# Patient Record
Sex: MALE | Race: OTHER | Employment: UNEMPLOYED | URBAN - METROPOLITAN AREA
[De-identification: names, ages, dates, MRNs, and addresses within clinical notes are randomized per-mention and may not be internally consistent; named-entity substitution may affect disease eponyms.]

---

## 2024-01-26 ENCOUNTER — HOSPITAL ENCOUNTER (EMERGENCY)
Facility: HOSPITAL | Age: 74
Discharge: HOME | End: 2024-01-26
Attending: EMERGENCY MEDICINE
Payer: MEDICARE

## 2024-01-26 VITALS
SYSTOLIC BLOOD PRESSURE: 154 MMHG | OXYGEN SATURATION: 97 % | TEMPERATURE: 98.5 F | HEART RATE: 61 BPM | DIASTOLIC BLOOD PRESSURE: 92 MMHG | WEIGHT: 182.7 LBS | RESPIRATION RATE: 20 BRPM

## 2024-01-26 DIAGNOSIS — L02.214 ABSCESS OF GROIN, RIGHT: Primary | ICD-10-CM

## 2024-01-26 PROCEDURE — 10061 I&D ABSCESS COMP/MULTIPLE: CPT | Performed by: PHYSICIAN ASSISTANT

## 2024-01-26 PROCEDURE — 99284 EMERGENCY DEPT VISIT MOD MDM: CPT | Performed by: PHYSICIAN ASSISTANT

## 2024-01-26 PROCEDURE — 99282 EMERGENCY DEPT VISIT SF MDM: CPT

## 2024-01-26 RX ORDER — LIDOCAINE HYDROCHLORIDE AND EPINEPHRINE 10; 10 MG/ML; UG/ML
1 INJECTION, SOLUTION INFILTRATION; PERINEURAL ONCE
Status: COMPLETED | OUTPATIENT
Start: 2024-01-26 | End: 2024-01-26

## 2024-01-26 RX ORDER — SULFAMETHOXAZOLE AND TRIMETHOPRIM 800; 160 MG/1; MG/1
1 TABLET ORAL 2 TIMES DAILY
Qty: 14 TABLET | Refills: 0 | Status: SHIPPED | OUTPATIENT
Start: 2024-01-26 | End: 2024-02-02

## 2024-01-26 RX ORDER — CEPHALEXIN 500 MG/1
500 CAPSULE ORAL EVERY 6 HOURS SCHEDULED
Qty: 40 CAPSULE | Refills: 0 | Status: SHIPPED | OUTPATIENT
Start: 2024-01-26 | End: 2024-02-05

## 2024-01-26 RX ADMIN — LIDOCAINE HYDROCHLORIDE,EPINEPHRINE BITARTRATE 1 ML: 10; .01 INJECTION, SOLUTION INFILTRATION; PERINEURAL at 11:26

## 2024-01-26 NOTE — ED PROVIDER NOTES
History  Chief Complaint   Patient presents with    Abscess     Abscess to right groin x3 days. No drainage reports, no medication taken     73-year-old male presents the emergency department with complaints of an abscess in the right groin.  States that he has had worsening pain and swelling in the area with a small bump over the past 3 days.  Denies injury to the area.  No history of similar symptoms.  He denies significant past medical history for diabetes.  He has not had any fevers or abdominal pain.  Denies testicular pain or swelling.  No problems with urination.       used: Yes ([id number])    Abscess  Location:  Pelvis  Ano-genital abscess location: right groin.  Abscess quality: induration, painful and redness    Abscess quality: not draining    Red streaking: no    Duration:  3 days  Progression:  Worsening  Pain details:     Quality:  Throbbing    Severity:  Moderate    Duration:  3 days    Timing:  Constant    Progression:  Worsening  Chronicity:  New  Context: not diabetes, not immunosuppression, not insect bite/sting and not skin injury    Relieved by:  Nothing  Ineffective treatments:  None tried  Associated symptoms: no fever, no headaches, no nausea and no vomiting        None       History reviewed. No pertinent past medical history.    History reviewed. No pertinent surgical history.    History reviewed. No pertinent family history.  I have reviewed and agree with the history as documented.    E-Cigarette/Vaping     E-Cigarette/Vaping Substances     Social History     Tobacco Use    Smoking status: Never    Smokeless tobacco: Never   Substance Use Topics    Alcohol use: Never    Drug use: Never       Review of Systems   Constitutional:  Negative for activity change, appetite change, chills and fever.   HENT:  Negative for congestion, dental problem, drooling, ear discharge, ear pain, mouth sores, nosebleeds, rhinorrhea, sore throat and trouble swallowing.    Eyes:  Negative for  pain, discharge and itching.   Respiratory:  Negative for cough, chest tightness, shortness of breath and wheezing.    Cardiovascular:  Negative for chest pain and palpitations.   Gastrointestinal:  Negative for abdominal pain, blood in stool, constipation, diarrhea, nausea and vomiting.   Endocrine: Negative for cold intolerance and heat intolerance.   Genitourinary:  Negative for difficulty urinating, dysuria, flank pain, frequency and urgency.   Skin:  Negative for wound.        Abscess    Allergic/Immunologic: Negative for food allergies and immunocompromised state.   Neurological:  Negative for dizziness, seizures, syncope, weakness, numbness and headaches.   Psychiatric/Behavioral:  Negative for agitation, behavioral problems and confusion.        Physical Exam  Physical Exam  Vitals and nursing note reviewed. Exam conducted with a chaperone present.   Constitutional:       Appearance: He is well-developed.   HENT:      Head: Normocephalic and atraumatic.   Cardiovascular:      Rate and Rhythm: Normal rate and regular rhythm.   Pulmonary:      Effort: Pulmonary effort is normal. No respiratory distress.      Breath sounds: No wheezing, rhonchi or rales.   Abdominal:      General: Abdomen is flat. Bowel sounds are normal. There is no distension.   Genitourinary:     Pubic Area: No rash.       Penis: Normal and circumcised.       Testes: Normal.       Skin:     General: Skin is warm and dry.   Neurological:      Mental Status: He is alert and oriented to person, place, and time.   Psychiatric:         Mood and Affect: Mood normal.         Behavior: Behavior normal.         Vital Signs  ED Triage Vitals [01/26/24 1111]   Temperature Pulse Respirations Blood Pressure SpO2   98.5 °F (36.9 °C) 61 20 154/92 97 %      Temp Source Heart Rate Source Patient Position - Orthostatic VS BP Location FiO2 (%)   Oral Monitor Sitting Left arm --      Pain Score       --           Vitals:    01/26/24 1111   BP: 154/92   Pulse:  61   Patient Position - Orthostatic VS: Sitting         Visual Acuity      ED Medications  Medications   lidocaine-epinephrine (XYLOCAINE/EPINEPHRINE) 1 %-1:100,000 injection 1 mL (1 mL Infiltration Given 1/26/24 1126)       Diagnostic Studies  Results Reviewed       None                   No orders to display              Procedures  Incision and drain    Date/Time: 1/26/2024 11:21 AM    Performed by: Marina Solitario PA-C  Authorized by: Marina Solitario PA-C  Universal Protocol:  Consent: Verbal consent obtained.  Consent given by: patient  Patient understanding: patient states understanding of the procedure being performed  Required items: required blood products, implants, devices, and special equipment available  Patient identity confirmed: verbally with patient    Patient location:  ED  Location:     Type:  Abscess    Size:  2    Location:  Anogenital    Anogenital location: right groin.  Anesthesia (see MAR for exact dosages):     Anesthesia method:  Local infiltration    Local anesthetic:  Lidocaine 1% WITH epi  Procedure details:     Complexity:  Simple    Needle aspiration: no      Incision types:  Stab incision    Scalpel blade:  11    Approach:  Open    Incision depth:  Subcutaneous    Wound management:  Probed and deloculated    Drainage:  Purulent and bloody    Drainage amount:  Moderate    Wound treatment:  Packing placed    Packing materials:  1/4 in gauze  Post-procedure details:     Patient tolerance of procedure:  Tolerated well, no immediate complications           ED Course                               SBIRT 22yo+      Flowsheet Row Most Recent Value   Initial Alcohol Screen: US AUDIT-C     1. How often do you have a drink containing alcohol? 0 Filed at: 01/26/2024 1110   2. How many drinks containing alcohol do you have on a typical day you are drinking?  0 Filed at: 01/26/2024 1110   3a. Male UNDER 65: How often do you have five or more drinks on one occasion? 0 Filed at: 01/26/2024 1110   3b.  FEMALE Any Age, or MALE 65+: How often do you have 4 or more drinks on one occassion? 0 Filed at: 01/26/2024 1110   Audit-C Score 0 Filed at: 01/26/2024 1110   REY: How many times in the past year have you...    Used an illegal drug or used a prescription medication for non-medical reasons? Never Filed at: 01/26/2024 1110                      Medical Decision Making  Differential diagnosis includes but not limited to: Abscess, cellulitis    Problems Addressed:  Abscess of groin, right: acute illness or injury    Amount and/or Complexity of Data Reviewed  Discussion of management or test interpretation with external provider(s): Discharge instructions reviewed with  number [id number].  Discussed antibiotic use and when to return for packing removal.  Return to ER precautions given for worsening pain, redness, swelling, or fever.  All questions answered.    Risk  Prescription drug management.             Disposition  Final diagnoses:   Abscess of groin, right     Time reflects when diagnosis was documented in both MDM as applicable and the Disposition within this note       Time User Action Codes Description Comment    1/26/2024 11:21 AM Marina Solitario Add [L02.214] Abscess of groin, right           ED Disposition       ED Disposition   Discharge    Condition   Stable    Date/Time   Fri Jan 26, 2024 1122    Comment   Stevenson Sinha discharge to home/self care.                   Follow-up Information       Follow up With Specialties Details Why Contact Info Additional Information    [facility] Emergency Medicine In 2 days For wound re-check [address] [facility] [address] [phone] [facility]            Discharge Medication List as of 1/26/2024 11:29 AM        START taking these medications    Details   cephalexin (KEFLEX) 500 mg capsule Take 1 capsule (500 mg total) by mouth every 6 (six) hours for 10  days, Starting Fri 1/26/2024, Until Mon 2/5/2024, Normal      sulfamethoxazole-trimethoprim (BACTRIM DS) 800-160 mg per tablet Take 1 tablet by mouth 2 (two) times a day for 7 days smx-tmp DS (BACTRIM) 800-160 mg tabs (1tab q12 D10), Starting Fri 1/26/2024, Until Fri 2/2/2024, Normal             No discharge procedures on file.    PDMP Review       None            ED Provider  Electronically Signed by             Marina Solitario PA-C  01/26/24 4391

## 2024-01-28 ENCOUNTER — HOSPITAL ENCOUNTER (EMERGENCY)
Facility: HOSPITAL | Age: 74
Discharge: HOME | End: 2024-01-28
Attending: EMERGENCY MEDICINE | Admitting: EMERGENCY MEDICINE

## 2024-01-28 VITALS
HEART RATE: 51 BPM | OXYGEN SATURATION: 96 % | RESPIRATION RATE: 20 BRPM | SYSTOLIC BLOOD PRESSURE: 161 MMHG | DIASTOLIC BLOOD PRESSURE: 78 MMHG | WEIGHT: 182.7 LBS | TEMPERATURE: 98.1 F

## 2024-01-28 DIAGNOSIS — Z51.89 VISIT FOR WOUND CHECK: Primary | ICD-10-CM

## 2024-01-28 DIAGNOSIS — L03.90 CELLULITIS: ICD-10-CM

## 2024-01-28 PROCEDURE — 99024 POSTOP FOLLOW-UP VISIT: CPT

## 2024-01-28 PROCEDURE — 99283 EMERGENCY DEPT VISIT LOW MDM: CPT

## 2024-01-28 NOTE — DISCHARGE INSTRUCTIONS
Follow-up with PCP in 2 days for wound recheck.  If you are unable to return to the emergency department in 2 days for wound recheck.  Continue taking antibiotics both as prescribed.  Return to ED sooner for new or worsening symptoms as discussed.

## 2024-01-28 NOTE — ED PROVIDER NOTES
History  Chief Complaint   Patient presents with    Wound Check     Patient reports he was told to come back to get the ribbon pulled out of the incision. Taking abx.      73 y.o. M presents to ED for wound check after I&D for groin abscess (without involvement of scrotum) 2 days ago. And for removal of packing. He reports he has been taking his antibiotics, when asked how he reports he takes 2 (one of each) in the morning, and one in the afternoon. He states pain is improved. He reports that there was some blood and pus on his dressing yesterday. He denies F, chills, redness visible to him, warmth, numbness, swelling, myalgias, nausea, vomiting, decreased ROM, penile pain, testicular pain.       History provided by:  Patient and medical records   used: Yes    Wound Check         Prior to Admission Medications   Prescriptions Last Dose Informant Patient Reported? Taking?   cephalexin (KEFLEX) 500 mg capsule 1/28/2024  No Yes   Sig: Take 1 capsule (500 mg total) by mouth every 6 (six) hours for 10 days   sulfamethoxazole-trimethoprim (BACTRIM DS) 800-160 mg per tablet 1/28/2024  No Yes   Sig: Take 1 tablet by mouth 2 (two) times a day for 7 days smx-tmp DS (BACTRIM) 800-160 mg tabs (1tab q12 D10)      Facility-Administered Medications: None       History reviewed. No pertinent past medical history.    Past Surgical History:   Procedure Laterality Date    CHOLECYSTECTOMY         History reviewed. No pertinent family history.  I have reviewed and agree with the history as documented.    E-Cigarette/Vaping    E-Cigarette Use Never User      E-Cigarette/Vaping Substances     Social History     Tobacco Use    Smoking status: Never    Smokeless tobacco: Never   Vaping Use    Vaping status: Never Used   Substance Use Topics    Alcohol use: Never    Drug use: Never       Review of Systems   Constitutional:  Negative for chills and fever.   Cardiovascular:  Negative for leg swelling.   Gastrointestinal:   Negative for abdominal pain, nausea and vomiting.   Genitourinary:  Negative for difficulty urinating, penile pain, penile swelling, scrotal swelling and testicular pain.   Musculoskeletal:  Negative for myalgias.   Skin:  Positive for wound. Negative for color change and rash.   Neurological:  Negative for weakness and numbness.   All other systems reviewed and are negative.      Physical Exam  Physical Exam  Vitals and nursing note reviewed. Exam conducted with a chaperone present (GREGORY Segal).   Constitutional:       General: He is awake. He is not in acute distress.     Appearance: Normal appearance. He is not ill-appearing, toxic-appearing or diaphoretic.   HENT:      Head: Normocephalic.      Mouth/Throat:      Lips: Pink.      Mouth: Mucous membranes are moist.   Eyes:      General: Vision grossly intact.   Cardiovascular:      Rate and Rhythm: Normal rate and regular rhythm.      Heart sounds: Normal heart sounds.   Pulmonary:      Effort: Pulmonary effort is normal. No respiratory distress.      Breath sounds: Normal breath sounds.   Abdominal:      General: There is no distension.      Palpations: Abdomen is soft.      Tenderness: There is no abdominal tenderness.   Genitourinary:      Skin:     General: Skin is warm and dry.      Capillary Refill: Capillary refill takes less than 2 seconds.      Findings: Erythema present. No bruising.   Neurological:      Mental Status: He is alert.         Vital Signs  ED Triage Vitals [01/28/24 1149]   Temperature Pulse Respirations Blood Pressure SpO2   98.1 °F (36.7 °C) (!) 51 20 161/78 96 %      Temp Source Heart Rate Source Patient Position - Orthostatic VS BP Location FiO2 (%)   Oral Monitor Lying Left arm --      Pain Score       --           Vitals:    01/28/24 1149   BP: 161/78   Pulse: (!) 51   Patient Position - Orthostatic VS: Lying         Visual Acuity      ED Medications  Medications - No data to display    Diagnostic Studies  Results Reviewed       None                    No orders to display              Procedures  Procedures         ED Course  ED Course as of 01/30/24 2308   Sun Jan 28, 2024   1222 Packing removed. No current drainage. Cellulitis noted upper thigh- erythema, warmth. No crepitus, no tenderness of area of cellulitis. Mild tenderness of site of abscess I&D. Wound care performed. Outlined area of cellulitis. Chaperoned by ED RN Yolanda.    1230 Via interpretor, antibiotic regimen was reviewed, patient was able to voice understanding of how he is supposed to be taking both antibiotics.    1237 Pulse is regular, no hypotension, patient is asymptomatic, discussed bradycardia with attending, stable for discharge.                                SBIRT 20yo+      Flowsheet Row Most Recent Value   Initial Alcohol Screen: US AUDIT-C     1. How often do you have a drink containing alcohol? 0 Filed at: 01/28/2024 1145   2. How many drinks containing alcohol do you have on a typical day you are drinking?  0 Filed at: 01/28/2024 1145   3a. Male UNDER 65: How often do you have five or more drinks on one occasion? 0 Filed at: 01/28/2024 1145   3b. FEMALE Any Age, or MALE 65+: How often do you have 4 or more drinks on one occassion? 0 Filed at: 01/28/2024 1145   Audit-C Score 0 Filed at: 01/28/2024 1145   REY: How many times in the past year have you...    Used an illegal drug or used a prescription medication for non-medical reasons? Never Filed at: 01/28/2024 1145                      Medical Decision Making  Packing removed. Area of erythema noted- marked with skin marker. No findings concerning for necrotizing soft tissue infection or fourniers.  No systemic symptoms. Patient was not taking antibiotics correctly. Patient thoroughly educated on antibiotic regimen, voiced understanding. Educated on need for 2 day follow up and ed return precautions.     All imaging and/or lab testing discussed with patient, strict return to ED precautions discussed. Patient  "recommended to follow up promptly with appropriate outpatient provider. Patient and/or family members verbalizes understanding and agrees with plan. Patient and/or family members were given opportunity to ask questions, all questions were answered at this time. Patient is stable for discharge.     Portions of the record may have been created with voice recognition software. Occasional wrong word or \"sound a like\" substitutions may have occurred due to the inherent limitations of voice recognition software. Read the chart carefully and recognize, using context, where substitutions have occurred.                Disposition  Final diagnoses:   Visit for wound check   Cellulitis     Time reflects when diagnosis was documented in both MDM as applicable and the Disposition within this note       Time User Action Codes Description Comment    1/28/2024 12:21 PM Gavi Vásquez [Z51.89] Visit for wound check     1/28/2024 12:21 PM Gavi Vásquez [L03.90] Cellulitis           ED Disposition       ED Disposition   Discharge    Condition   Stable    Date/Time   Sun Jan 28, 2024 1231    Comment   Stevenson Sinha discharge to home/self care.                   Follow-up Information       Follow up With Specialties Details Why Contact Info Additional Information    [facility] Family Medicine Schedule an appointment as soon as possible for a visit in 2 days For follow up regarding your symptoms, For wound re-check [address] [place] [address] [phone] [facility], [address] [place] [address], [address] [phone]    [facility] Emergency Department Emergency Medicine In 2 days For wound re-check [address] [facility] [address] [phone] [facility] Emergency Department            Discharge Medication List as of 1/28/2024 12:38 PM    "     CONTINUE these medications which have NOT CHANGED    Details   cephalexin (KEFLEX) 500 mg capsule Take 1 capsule (500 mg total) by mouth every 6 (six) hours for 10 days, Starting Fri 1/26/2024, Until Mon 2/5/2024, Normal      sulfamethoxazole-trimethoprim (BACTRIM DS) 800-160 mg per tablet Take 1 tablet by mouth 2 (two) times a day for 7 days smx-tmp DS (BACTRIM) 800-160 mg tabs (1tab q12 D10), Starting Fri 1/26/2024, Until Fri 2/2/2024, Normal                 PDMP Review       None            ED Provider  Electronically Signed by             Gavi Vásquez PA-C  01/30/24 0108

## 2024-01-30 ENCOUNTER — HOSPITAL ENCOUNTER (EMERGENCY)
Facility: HOSPITAL | Age: 74
Discharge: HOME | End: 2024-01-30
Attending: EMERGENCY MEDICINE | Admitting: EMERGENCY MEDICINE

## 2024-01-30 VITALS
DIASTOLIC BLOOD PRESSURE: 72 MMHG | RESPIRATION RATE: 18 BRPM | HEART RATE: 54 BPM | OXYGEN SATURATION: 97 % | WEIGHT: 180.9 LBS | TEMPERATURE: 98.1 F | SYSTOLIC BLOOD PRESSURE: 143 MMHG

## 2024-01-30 DIAGNOSIS — Z51.89 VISIT FOR WOUND CHECK: Primary | ICD-10-CM

## 2024-01-30 PROCEDURE — 99024 POSTOP FOLLOW-UP VISIT: CPT | Performed by: PHYSICIAN ASSISTANT

## 2024-01-30 PROCEDURE — 99282 EMERGENCY DEPT VISIT SF MDM: CPT

## 2024-01-30 NOTE — ED PROVIDER NOTES
History  Chief Complaint   Patient presents with    Wound Check     Pt presents to ed for follow up, states he had a recent procdure done and they marked his though with a marker telling him to keep an eye on it and follow up with the ed in a few days. Pt has no complaints or concerns at this time     73-year-old male presents the emergency department for a wound check.  Seen in the emergency department 4 days ago for an incision and drainage of a small abscess in the right inner thigh.  Had been taking antibiotics but not clearly as prescribed.  Seen in the emergency department 2 days ago for packing removal and noted to have a small area of cellulitis.  Has been taking antibiotics correctly since repeat visit.  States that area of redness seems to have resolved.  He denies pain in the area.  No fevers.      History provided by:  Patient   used: Yes ([id number])    Wound Check   He was treated in the ED 2 to 3 days ago.       Prior to Admission Medications   Prescriptions Last Dose Informant Patient Reported? Taking?   cephalexin (KEFLEX) 500 mg capsule   No No   Sig: Take 1 capsule (500 mg total) by mouth every 6 (six) hours for 10 days   sulfamethoxazole-trimethoprim (BACTRIM DS) 800-160 mg per tablet   No No   Sig: Take 1 tablet by mouth 2 (two) times a day for 7 days smx-tmp DS (BACTRIM) 800-160 mg tabs (1tab q12 D10)      Facility-Administered Medications: None       History reviewed. No pertinent past medical history.    Past Surgical History:   Procedure Laterality Date    CHOLECYSTECTOMY         History reviewed. No pertinent family history.  I have reviewed and agree with the history as documented.    E-Cigarette/Vaping    E-Cigarette Use Never User      E-Cigarette/Vaping Substances     Social History     Tobacco Use    Smoking status: Never    Smokeless tobacco: Never   Vaping Use    Vaping status: Never Used   Substance Use Topics    Alcohol use: Never    Drug use: Never       Review of  Systems   Constitutional:  Negative for chills and fever.   Respiratory:  Negative for cough.    Cardiovascular:  Negative for chest pain.   Musculoskeletal:  Negative for arthralgias and back pain.   Skin:  Negative for color change, rash and wound.   All other systems reviewed and are negative.      Physical Exam  Physical Exam  Vitals and nursing note reviewed.   Constitutional:       Appearance: He is well-developed.   HENT:      Head: Normocephalic and atraumatic.   Cardiovascular:      Rate and Rhythm: Normal rate.   Pulmonary:      Effort: Pulmonary effort is normal. No respiratory distress.   Musculoskeletal:      Comments: Mild induration over previous site of I&D in the right groin. No additional drainage able to be expressed. No surrounding erythema in area previously demarcated on the inner thigh with skin pen.  Non-tender to palpation.   Skin:     General: Skin is warm and dry.   Neurological:      Mental Status: He is alert and oriented to person, place, and time.   Psychiatric:         Mood and Affect: Mood normal.         Behavior: Behavior normal.         Vital Signs  ED Triage Vitals [01/30/24 1056]   Temperature Pulse Respirations Blood Pressure SpO2   98.1 °F (36.7 °C) (!) 54 18 143/72 97 %      Temp Source Heart Rate Source Patient Position - Orthostatic VS BP Location FiO2 (%)   Tympanic Monitor Sitting Left arm --      Pain Score       --           Vitals:    01/30/24 1056   BP: 143/72   Pulse: (!) 54   Patient Position - Orthostatic VS: Sitting         Visual Acuity      ED Medications  Medications - No data to display    Diagnostic Studies  Results Reviewed       None                   No orders to display              Procedures  Procedures         ED Course                               SBIRT 20yo+      Flowsheet Row Most Recent Value   Initial Alcohol Screen: US AUDIT-C     1. How often do you have a drink containing alcohol? 0 Filed at: 01/30/2024 1102   2. How many drinks containing  alcohol do you have on a typical day you are drinking?  0 Filed at: 01/30/2024 1102   3a. Male UNDER 65: How often do you have five or more drinks on one occasion? 0 Filed at: 01/30/2024 1102   3b. FEMALE Any Age, or MALE 65+: How often do you have 4 or more drinks on one occassion? 0 Filed at: 01/30/2024 1102   Audit-C Score 0 Filed at: 01/30/2024 1102                      Medical Decision Making  Differential diagnosis includes but not limited to: Encounter for wound check, cellulitis    Problems Addressed:  Visit for wound check: acute illness or injury             Disposition  Final diagnoses:   Visit for wound check     Time reflects when diagnosis was documented in both MDM as applicable and the Disposition within this note       Time User Action Codes Description Comment    1/30/2024 11:12 AM Marina Solitario Add [Z51.89] Visit for wound check           ED Disposition       ED Disposition   Discharge    Condition   Stable    Date/Time   Tue Jan 30, 2024 1112    Comment   Stevenson Sinha discharge to home/self care.                   Follow-up Information    None         Patient's Medications   Discharge Prescriptions    No medications on file       No discharge procedures on file.    PDMP Review       None            ED Provider  Electronically Signed by             Marina Solitario PA-C  01/30/24 1116

## 2024-03-04 ENCOUNTER — HOSPITAL ENCOUNTER (EMERGENCY)
Facility: HOSPITAL | Age: 74
Discharge: HOME | End: 2024-03-04
Attending: EMERGENCY MEDICINE

## 2024-03-04 VITALS
SYSTOLIC BLOOD PRESSURE: 159 MMHG | RESPIRATION RATE: 18 BRPM | WEIGHT: 181.66 LBS | OXYGEN SATURATION: 95 % | HEART RATE: 87 BPM | TEMPERATURE: 98.7 F | DIASTOLIC BLOOD PRESSURE: 83 MMHG

## 2024-03-04 DIAGNOSIS — L02.91 ABSCESS: Primary | ICD-10-CM

## 2024-03-04 PROCEDURE — 99282 EMERGENCY DEPT VISIT SF MDM: CPT

## 2024-03-04 PROCEDURE — 99284 EMERGENCY DEPT VISIT MOD MDM: CPT | Performed by: EMERGENCY MEDICINE

## 2024-03-04 RX ORDER — DOXYCYCLINE HYCLATE 100 MG/1
100 CAPSULE ORAL 2 TIMES DAILY
Qty: 14 CAPSULE | Refills: 0 | Status: SHIPPED | OUTPATIENT
Start: 2024-03-04 | End: 2024-03-11

## 2024-03-04 NOTE — ED PROVIDER NOTES
History  Chief Complaint   Patient presents with    Abscess     States he is bleeding from area where he had an abscess packed to right inner thigh/groin     73-year-old male presenting emerged department for right groin abscess.  Patient notes that this has been ongoing for the past couple of months after an I&D.  Notes that over the past week has had increased drainage from the area again.  Minimal pain.  Some redness.  Small amount of pain.        None       History reviewed. No pertinent past medical history.    Past Surgical History:   Procedure Laterality Date    CHOLECYSTECTOMY         History reviewed. No pertinent family history.  I have reviewed and agree with the history as documented.    E-Cigarette/Vaping    E-Cigarette Use Never User      E-Cigarette/Vaping Substances     Social History     Tobacco Use    Smoking status: Never    Smokeless tobacco: Never   Vaping Use    Vaping status: Never Used   Substance Use Topics    Alcohol use: Never    Drug use: Never       Review of Systems   Constitutional:  Negative for chills and fever.   HENT:  Negative for ear pain and sore throat.    Eyes:  Negative for pain and visual disturbance.   Respiratory:  Negative for cough and shortness of breath.    Cardiovascular:  Negative for chest pain and palpitations.   Gastrointestinal:  Negative for abdominal pain and vomiting.   Genitourinary:  Negative for dysuria and hematuria.   Musculoskeletal:  Negative for arthralgias and back pain.   Skin:  Positive for wound. Negative for color change and rash.   Neurological:  Negative for seizures and syncope.   All other systems reviewed and are negative.      Physical Exam  Physical Exam  Vitals and nursing note reviewed.   Constitutional:       General: He is not in acute distress.     Appearance: He is well-developed.   HENT:      Head: Normocephalic and atraumatic.   Eyes:      Conjunctiva/sclera: Conjunctivae normal.   Cardiovascular:      Rate and Rhythm: Normal rate  and regular rhythm.      Heart sounds: No murmur heard.  Pulmonary:      Effort: Pulmonary effort is normal. No respiratory distress.      Breath sounds: Normal breath sounds.   Abdominal:      Palpations: Abdomen is soft.      Tenderness: There is no abdominal tenderness.   Musculoskeletal:         General: No swelling.      Cervical back: Neck supple.      Comments: Right groin wound with mild amount of surrounding erythema with drainage from a central ulceration.   Skin:     General: Skin is warm and dry.      Capillary Refill: Capillary refill takes less than 2 seconds.   Neurological:      Mental Status: He is alert.   Psychiatric:         Mood and Affect: Mood normal.         Vital Signs  ED Triage Vitals [03/04/24 0931]   Temperature Pulse Respirations Blood Pressure SpO2   98.7 °F (37.1 °C) 87 18 159/83 95 %      Temp Source Heart Rate Source Patient Position - Orthostatic VS BP Location FiO2 (%)   Oral Monitor Lying Left arm --      Pain Score       --           Vitals:    03/04/24 0931   BP: 159/83   Pulse: 87   Patient Position - Orthostatic VS: Lying         Visual Acuity      ED Medications  Medications - No data to display    Diagnostic Studies  Results Reviewed       None                   No orders to display              Procedures  Procedures         ED Course                                             Medical Decision Making  73 old male with persistent right groin wound.  Has mild amount of erythema, could be healing reaction versus new cellulitis.  Will treat with doxycycline.  18-gauge needle aspiration attempted but no purulence aspirated as it appears that wound is already draining.    Risk  Prescription drug management.             Disposition  Final diagnoses:   Abscess     Time reflects when diagnosis was documented in both MDM as applicable and the Disposition within this note       Time User Action Codes Description Comment    3/4/2024  9:43 AM Abraham Morin Add [L02.91] Abscess            ED Disposition       ED Disposition   Discharge    Condition   Stable    Date/Time   Mon Mar 4, 2024  9:43 AM    Comment   Stevenson Sinha discharge to home/self care.                   Follow-up Information       Follow up With Specialties Details Why Contact Info Additional Information    [address], [address] [address] [phone] [facility], [address] [place] [address], [place], [place], [address] [phone]            Patient's Medications   Discharge Prescriptions    DOXYCYCLINE HYCLATE (VIBRAMYCIN) 100 MG CAPSULE    Take 1 capsule (100 mg total) by mouth 2 (two) times a day for 7 days       Start Date: 3/4/2024  End Date: 3/11/2024       Order Dose: 100 mg       Quantity: 14 capsule    Refills: 0       No discharge procedures on file.    PDMP Review       None            ED Provider  Electronically Signed by             Abraham Morin MD  03/04/24 0955

## 2024-04-02 ENCOUNTER — TELEPHONE (OUTPATIENT)
Dept: FAMILY MEDICINE CLINIC | Facility: CLINIC | Age: 74
End: 2024-04-02

## 2024-04-02 NOTE — TELEPHONE ENCOUNTER
Good morning, my name is Stevenson Sinha. My phone number is [phone], it's to ask for a very kind appointment and thank you.      NP appt schedule for 4/11/24.

## 2024-10-02 LAB
CREAT ?TM UR-SCNC: 203 UMOL/L
EXT ALBUMIN URINE RANDOM: 1.3
HBA1C MFR BLD HPLC: 7.5 %
HCV AB SER-ACNC: NORMAL
MICROALBUMIN/CREAT UR: 6 MG/G{CREAT}